# Patient Record
Sex: FEMALE | Race: BLACK OR AFRICAN AMERICAN | NOT HISPANIC OR LATINO | Employment: FULL TIME | URBAN - METROPOLITAN AREA
[De-identification: names, ages, dates, MRNs, and addresses within clinical notes are randomized per-mention and may not be internally consistent; named-entity substitution may affect disease eponyms.]

---

## 2017-08-21 ENCOUNTER — HOSPITAL ENCOUNTER (EMERGENCY)
Facility: HOSPITAL | Age: 37
Discharge: HOME | End: 2017-08-21
Attending: EMERGENCY MEDICINE
Payer: MEDICAID

## 2017-08-21 VITALS
HEIGHT: 61 IN | WEIGHT: 190 LBS | BODY MASS INDEX: 35.87 KG/M2 | RESPIRATION RATE: 20 BRPM | DIASTOLIC BLOOD PRESSURE: 81 MMHG | OXYGEN SATURATION: 95 % | HEART RATE: 89 BPM | TEMPERATURE: 98 F | SYSTOLIC BLOOD PRESSURE: 137 MMHG

## 2017-08-21 DIAGNOSIS — J06.9 UPPER RESPIRATORY TRACT INFECTION, UNSPECIFIED TYPE: Primary | ICD-10-CM

## 2017-08-21 DIAGNOSIS — R09.81 NASAL CONGESTION: ICD-10-CM

## 2017-08-21 PROCEDURE — 99283 EMERGENCY DEPT VISIT LOW MDM: CPT | Mod: 25

## 2017-08-21 PROCEDURE — 63600175 PHARM REV CODE 636 W HCPCS: Performed by: EMERGENCY MEDICINE

## 2017-08-21 PROCEDURE — 96372 THER/PROPH/DIAG INJ SC/IM: CPT

## 2017-08-21 RX ORDER — PROMETHAZINE HYDROCHLORIDE AND DEXTROMETHORPHAN HYDROBROMIDE 6.25; 15 MG/5ML; MG/5ML
5 SYRUP ORAL EVERY 6 HOURS PRN
Qty: 120 ML | Refills: 0 | Status: SHIPPED | OUTPATIENT
Start: 2017-08-21 | End: 2017-08-31

## 2017-08-21 RX ORDER — DEXAMETHASONE SODIUM PHOSPHATE 4 MG/ML
8 INJECTION, SOLUTION INTRA-ARTICULAR; INTRALESIONAL; INTRAMUSCULAR; INTRAVENOUS; SOFT TISSUE
Status: COMPLETED | OUTPATIENT
Start: 2017-08-21 | End: 2017-08-21

## 2017-08-21 RX ADMIN — DEXAMETHASONE SODIUM PHOSPHATE 8 MG: 4 INJECTION, SOLUTION INTRAMUSCULAR; INTRAVENOUS at 02:08

## 2017-08-21 NOTE — ED NOTES
Discharge instructions given to pt by provider; she was sent to lobby before dc vitals could be done

## 2017-08-21 NOTE — ED PROVIDER NOTES
SCRIBE #1 NOTE: I, Claire Hyatt, am scribing for, and in the presence of, Van Khan MD. I have scribed the entire note.      History      Chief Complaint   Patient presents with    Sinusitis     watery eyes, runny nose, facial pain, and cough since Sunday       Review of patient's allergies indicates:   Allergen Reactions    Aspirin Rash    Aspirin, buffered Rash        HPI   HPI    8/21/2017, 1:12 PM   History obtained from the patient      History of Present Illness: Fransisca Willis is a 37 y.o. female patient who presents to the Emergency Department for sinusitis which onset gradually 2 days ago. Symptoms are constant and moderate in severity.  No mitigating or exacerbating factors reported. Associated sxs include watery eyes, congestion, sinus pressure, cough, and rhinorrhea. Patient denies any fever, chills, SOB, CP, HA, dizziness, and all other sxs at this time. Pt states that she takes oral birth control daily. No prior Tx rpeorted. No further complaints or concerns at this time.         Arrival mode: Personal vehicle      PCP: Primary Doctor No       Past Medical History:  Past Medical History:   Diagnosis Date    Miscarriage     x1    Ovarian tumor        Past Surgical History:  Past Surgical History:   Procedure Laterality Date    LEFT OOPHORECTOMY           Family History:  Family History   Problem Relation Age of Onset    Hypertension         Social History:  Social History     Social History Main Topics    Smoking status: Never Smoker    Smokeless tobacco: Not on file    Alcohol use No    Drug use: No    Sexual activity: Not on file       ROS   Review of Systems   Constitutional: Negative for chills and fever.   HENT: Positive for congestion, rhinorrhea and sinus pressure. Negative for sore throat.    Eyes: Positive for discharge.   Respiratory: Positive for cough. Negative for shortness of breath.    Cardiovascular: Negative for chest pain.   Gastrointestinal: Negative for  "nausea.   Genitourinary: Negative for dysuria.   Musculoskeletal: Negative for back pain.   Skin: Negative for rash.   Neurological: Negative for dizziness, weakness and headaches.   Hematological: Does not bruise/bleed easily.       Physical Exam      Initial Vitals [08/21/17 1306]   BP Pulse Resp Temp SpO2   137/81 89 20 98.4 °F (36.9 °C) 95 %      MAP       99.67          Physical Exam  Nursing Notes and Vital Signs Reviewed.  Constitutional: Patient is in no apparent distress. Well-developed and well-nourished.  Head: Atraumatic. Normocephalic.  Eyes: PERRL. EOM intact. Conjunctivae are not pale. No scleral icterus.  ENT: Mucous membranes are moist. Congestion. Oropharynx is erythremic without extudates and symmetric.    Neck: Supple. Full ROM. No lymphadenopathy.  Cardiovascular: Regular rate. Regular rhythm. No murmurs, rubs, or gallops. Distal pulses are 2+ and symmetric.  Pulmonary/Chest: No respiratory distress. Clear to auscultation bilaterally. No wheezing, rales, or rhonchi.  Abdominal: Soft and non-distended.  There is no tenderness.  No rebound, guarding, or rigidity. Good bowel sounds.  Genitourinary: No CVA tenderness  Musculoskeletal: Moves all extremities. No obvious deformities. No edema. No calf tenderness.  Skin: Warm and dry.  Neurological:  Alert, awake, and appropriate.  Normal speech.  No acute focal neurological deficits are appreciated.  Psychiatric: Normal affect. Good eye contact. Appropriate in content.    ED Course    Procedures  ED Vital Signs:  Vitals:    08/21/17 1306   BP: 137/81   Pulse: 89   Resp: 20   Temp: 98.4 °F (36.9 °C)   TempSrc: Oral   SpO2: 95%   Weight: 86.2 kg (190 lb)   Height: 5' 1" (1.549 m)              The Emergency Provider reviewed the vital signs and test results, which are outlined above.    ED Discussion   1:16 PM:Discussed with pt all pertinent ED information and results. Discussed pt dx  and plan of tx. Gave pt all f/u and return to the ED instructions. All " questions and concerns were addressed at this time. Pt expresses understanding of information and instructions, and is comfortable with plan to discharge. Pt is stable for discharge.        ED Medication(s):  Medications - No data to display    New Prescriptions    PROMETHAZINE-DEXTROMETHORPHAN (PROMETHAZINE-DM) 6.25-15 MG/5 ML SYRP    Take 5 mLs by mouth every 6 (six) hours as needed.       Follow-up Information     [facility] in 2 days.    Contact information:  [address] [facility] [id number] [phone]                     Medical Decision Making              Scribe Attestation:   Scribe #1: I performed the above scribed service and the documentation accurately describes the services I performed. I attest to the accuracy of the note.    Attending:   Physician Attestation Statement for Scribe #1: I, Van Khan MD, personally performed the services described in this documentation, as scribed by Claire Hyatt, in my presence, and it is both accurate and complete.          Clinical Impression       ICD-10-CM ICD-9-CM   1. Upper respiratory tract infection, unspecified type J06.9 465.9   2. Nasal congestion R09.81 478.19       Disposition:   Disposition: Discharged  Condition: Stable         Van Khan MD  08/21/17 3546

## 2017-08-26 ENCOUNTER — HOSPITAL ENCOUNTER (EMERGENCY)
Facility: HOSPITAL | Age: 37
Discharge: HOME | End: 2017-08-26
Payer: MEDICAID

## 2017-08-26 VITALS
DIASTOLIC BLOOD PRESSURE: 88 MMHG | TEMPERATURE: 98 F | HEART RATE: 72 BPM | RESPIRATION RATE: 18 BRPM | WEIGHT: 190 LBS | HEIGHT: 61 IN | OXYGEN SATURATION: 100 % | SYSTOLIC BLOOD PRESSURE: 125 MMHG | BODY MASS INDEX: 35.87 KG/M2

## 2017-08-26 DIAGNOSIS — N93.8 DYSFUNCTIONAL UTERINE BLEEDING: Primary | ICD-10-CM

## 2017-08-26 LAB
B-HCG UR QL: NEGATIVE
BILIRUB UR QL STRIP: NEGATIVE
CLARITY UR: CLEAR
COLOR UR: YELLOW
GLUCOSE UR QL STRIP: NEGATIVE
HGB UR QL STRIP: ABNORMAL
KETONES UR QL STRIP: NEGATIVE
LEUKOCYTE ESTERASE UR QL STRIP: NEGATIVE
NITRITE UR QL STRIP: NEGATIVE
PH UR STRIP: 7 [PH] (ref 5–8)
PROT UR QL STRIP: NEGATIVE
SP GR UR STRIP: 1.01 (ref 1–1.03)
URN SPEC COLLECT METH UR: ABNORMAL
UROBILINOGEN UR STRIP-ACNC: NEGATIVE EU/DL

## 2017-08-26 PROCEDURE — 81003 URINALYSIS AUTO W/O SCOPE: CPT

## 2017-08-26 PROCEDURE — 81025 URINE PREGNANCY TEST: CPT

## 2017-08-26 PROCEDURE — 99283 EMERGENCY DEPT VISIT LOW MDM: CPT

## 2017-08-26 NOTE — ED PROVIDER NOTES
"SCRIBE #1 NOTE: I, Claire Hyatt, am scribing for, and in the presence of,GAMA Quiroz NP . I have scribed the entire note.      History      Chief Complaint   Patient presents with    Vaginal Bleeding     reports LMP on 8/13 and today found large amount blood in toilet after urinating.        Review of patient's allergies indicates:   Allergen Reactions    Aspirin Rash    Aspirin, buffered Rash        HPI   HPI    8/26/2017, 6:13 PM   History obtained from the patient      History of Present Illness: Fransisca Willis is a 37 y.o. female patient who presents to the Emergency Department for vaginal bleeding which onset gradually today while using the bathroom about 45 minutes PTA. Pt states that she noticed a large amount of bleeding in the toilet. Pt states that last menstrual cycle was 8/13/17. Symptoms are constant and moderate in severity.  No mitigating or exacerbating factors reported. Associated sxs include "heartburn". Patient denies any fever, chills, constipation, hematochezia, dysuria, hematuria, urinary frequency, difficulty urinating, urine decreased, N/V/D, back pain, vaginal discharge/odor, vaginal pain, and all other sxs at this time. No prior Tx reported. No further complaints or concerns at this time.         Arrival mode: Personal vehicle      PCP: Primary Doctor No       Past Medical History:  Past Medical History:   Diagnosis Date    Miscarriage     x1    Ovarian tumor        Past Surgical History:  Past Surgical History:   Procedure Laterality Date    LEFT OOPHORECTOMY           Family History:  Family History   Problem Relation Age of Onset    Hypertension         Social History:  Social History     Social History Main Topics    Smoking status: Never Smoker    Smokeless tobacco: Not on file    Alcohol use No    Drug use: No    Sexual activity: Not on file       ROS   Review of Systems   Constitutional: Negative for chills and fever.   HENT: Negative for sore throat.    Respiratory: " "Negative for shortness of breath.    Cardiovascular: Negative for chest pain.   Gastrointestinal: Negative for abdominal pain, blood in stool, constipation, diarrhea, nausea and vomiting.        + "heartburn"   Genitourinary: Positive for vaginal bleeding. Negative for decreased urine volume, difficulty urinating, dysuria, frequency, vaginal discharge and vaginal pain.   Musculoskeletal: Negative for back pain.   Skin: Negative for rash.   Neurological: Negative for weakness.   Hematological: Does not bruise/bleed easily.       Physical Exam      Initial Vitals [08/26/17 1807]   BP Pulse Resp Temp SpO2   125/88 72 18 98.2 °F (36.8 °C) 100 %      MAP       100.33          Physical Exam  Nursing Notes and Vital Signs Reviewed.  Constitutional: Patient is in no apparent distress. Well-developed and well-nourished.  Head: Atraumatic. Normocephalic.  Eyes: PERRL. EOM intact. Conjunctivae are not pale. No scleral icterus.  ENT: Mucous membranes are moist. Oropharynx is clear and symmetric.    Neck: Supple. Full ROM. No lymphadenopathy.  Cardiovascular: Regular rate. Regular rhythm. No murmurs, rubs, or gallops. Distal pulses are 2+ and symmetric.  Pulmonary/Chest: No respiratory distress. Clear to auscultation bilaterally. No wheezing, rales, or rhonchi.  Abdominal: Soft and non-distended.  There is no tenderness.  No rebound, guarding, or rigidity. Good bowel sounds.  Genitourinary: No CVA tenderness  Musculoskeletal: Moves all extremities. No obvious deformities. No edema. No calf tenderness.  Skin: Warm and dry.  Neurological:  Alert, awake, and appropriate.  Normal speech.  No acute focal neurological deficits are appreciated.  Psychiatric: Normal affect. Good eye contact. Appropriate in content.    ED Course    Procedures  ED Vital Signs:  Vitals:    08/26/17 1807   BP: 125/88   Pulse: 72   Resp: 18   Temp: 98.2 °F (36.8 °C)   TempSrc: Oral   SpO2: 100%   Weight: 86.2 kg (190 lb)   Height: 5' 1" (1.549 m) "       Abnormal Lab Results:  Labs Reviewed   URINALYSIS - Abnormal; Notable for the following:        Result Value    Occult Blood UA Trace (*)     All other components within normal limits   PREGNANCY TEST, URINE RAPID        All Lab Results:  Results for orders placed or performed during the hospital encounter of 08/26/17   Pregnancy, urine rapid (UPT)   Result Value Ref Range    Preg Test, Ur Negative    Urinalysis Catheterized   Result Value Ref Range    Specimen UA Urine, Catheterized     Color, UA Yellow Yellow, Straw, Liv    Appearance, UA Clear Clear    pH, UA 7.0 5.0 - 8.0    Specific Gravity, UA 1.015 1.005 - 1.030    Protein, UA Negative Negative    Glucose, UA Negative Negative    Ketones, UA Negative Negative    Bilirubin (UA) Negative Negative    Occult Blood UA Trace (A) Negative    Nitrite, UA Negative Negative    Urobilinogen, UA Negative <2.0 EU/dL    Leukocytes, UA Negative Negative                The Emergency Provider reviewed the vital signs and test results, which are outlined above.    ED Discussion     7:29 PM:  Discussed with pt all pertinent ED information and results. Discussed pt dx and plan of tx. Gave pt all f/u and return to the ED instructions. All questions and concerns were addressed at this time. Pt expresses understanding of information and instructions, and is comfortable with plan to discharge. Pt is stable for discharge.          ED Medication(s):  Medications - No data to display    New Prescriptions    No medications on file             Medical Decision Making    Medical Decision Making:   Clinical Tests:   Lab Tests: Reviewed and Ordered           Scribe Attestation:   Scribe #1: I performed the above scribed service and the documentation accurately describes the services I performed. I attest to the accuracy of the note.    Attending:   Physician Attestation Statement for Scribe #1: GAMA LIMON NP, personally performed the services described in this documentation, as  scribed by Claire Hyatt, in my presence, and it is both accurate and complete.          Clinical Impression     Dysfunctional uterine Bleeding    Disposition:   Disposition: Discharged  Condition: Stable         Gwyn Quiroz NP  08/26/17 2741

## 2024-01-19 PROBLEM — Z11.3 SCREENING EXAMINATION FOR STD (SEXUALLY TRANSMITTED DISEASE): Status: ACTIVE | Noted: 2024-01-19

## 2024-01-19 PROBLEM — N76.0 ACUTE VAGINITIS: Status: ACTIVE | Noted: 2024-01-19

## 2024-01-26 PROBLEM — R92.8 ABNORMAL FINDING ON BREAST IMAGING: Status: ACTIVE | Noted: 2024-01-26

## 2024-02-01 ENCOUNTER — TELEPHONE (OUTPATIENT)
Dept: SURGERY | Facility: CLINIC | Age: 44
End: 2024-02-01
Payer: MEDICAID

## 2024-02-01 DIAGNOSIS — R92.8 ABNORMAL FINDING ON BREAST IMAGING: Primary | ICD-10-CM

## 2024-02-05 ENCOUNTER — HOSPITAL ENCOUNTER (OUTPATIENT)
Dept: RADIOLOGY | Facility: HOSPITAL | Age: 44
Discharge: HOME | End: 2024-02-05
Attending: SURGERY
Payer: MEDICAID

## 2024-02-05 DIAGNOSIS — R92.8 ABNORMAL FINDING ON BREAST IMAGING: ICD-10-CM

## 2024-02-05 PROCEDURE — 19083 BX BREAST 1ST LESION US IMAG: CPT | Mod: LT,,, | Performed by: RADIOLOGY

## 2024-02-05 PROCEDURE — 88305 TISSUE EXAM BY PATHOLOGIST: CPT | Performed by: PATHOLOGY

## 2024-02-05 PROCEDURE — 88305 TISSUE EXAM BY PATHOLOGIST: CPT | Mod: 26,,, | Performed by: PATHOLOGY

## 2024-02-05 PROCEDURE — 77065 DX MAMMO INCL CAD UNI: CPT | Mod: 26,LT,, | Performed by: RADIOLOGY

## 2024-02-05 PROCEDURE — 27200940 US BREAST BIOPSY WITH IMAGING 1ST SITE LEFT

## 2024-02-05 PROCEDURE — A4648 IMPLANTABLE TISSUE MARKER: HCPCS

## 2024-02-05 PROCEDURE — 77065 DX MAMMO INCL CAD UNI: CPT | Mod: TC,LT

## 2024-02-07 ENCOUNTER — TELEPHONE (OUTPATIENT)
Dept: SURGERY | Facility: CLINIC | Age: 44
End: 2024-02-07
Payer: MEDICAID

## 2024-02-07 LAB
FINAL PATHOLOGIC DIAGNOSIS: NORMAL
GROSS: NORMAL
Lab: NORMAL

## 2024-02-07 NOTE — TELEPHONE ENCOUNTER
Called patient to review biopsy results, no answer and voicemail box full. Will continue to contact.     ----- Message from Rafal Abraham MD sent at 2/7/2024 10:33 AM CST -----  Benign and concordant.      Thank you.

## 2024-02-08 ENCOUNTER — TELEPHONE (OUTPATIENT)
Dept: SURGERY | Facility: CLINIC | Age: 44
End: 2024-02-08
Payer: MEDICAID

## 2024-02-08 NOTE — TELEPHONE ENCOUNTER
Called patient to discuss breast biopsy results, pt scheduled with Camille Ramirez to discuss results more and future follow up imaging.     ----- Message from Ericka Torres MD sent at 2/7/2024 11:29 AM CST -----  Since she was never seen. You can make a follow up with Camille to review results.

## 2024-02-08 NOTE — PROGRESS NOTES
Breast Surgical Oncology  [place]          PCP:  Liseth, Primary Doctor  Date of Service: 2024    CHIEF COMPLAINT:   Abnormal mammogram    DIAGNOSIS:   Fransisca Willis is a 43 y.o. female who is kindly referred by Dr. Jeaneth Farrell for evaluation of an abnormal mammogram.     2024- Pt had a routine duncan screening mammo at [facility] that revealed a 6 mm nodule in the left breast at the 7 oclock position- diagnostic imaging recommended    2024- left diagnostic and ultrasound revealed a solid nodule- ultrasound biopsy was recommended    2024- left breast ultrasound biopsy was performed  Pathology revealed:  Left breast, 7 o'clock, core biopsy:   Fibroadenoma.   Background breast tissue shows fibrocystic changes including usual ductal hyperplasia and apocrine metaplasia.   No evidence of atypia or malignancy.     Comment:   This case was also reviewed by Dr. ANTHONY Marquez, who concurs with the above diagnosis.   Benign and concordant per Dr. Rafal Abraham    Pt relates slight soreness left breast biopsy site.      Her breast cancer risk factor profile is as follows: Menarche at 12, Menopause at n/a.  She is . Age at first live birth was 20.     LMP- 2019 due to ablation  HRT- none  Breast feeding- none  Dense breasts- cat B  ETOH- occasionally  Genetic mutation- none  radiation to neck or chest wall- none  previous breast biopsy or breast surgery- atypical ductal hyperplasia or lobular hyperplasia- left breast ultrasound biopsy    FH:father colon cancer - 61, no breast cancer history in family        FAMILY HISTORY:     Family History   Problem Relation Age of Onset    Hypertension Unknown         PAST MEDICAL HISTORY:     Past Medical History:   Diagnosis Date    Depression     Miscarriage     x1    Ovarian tumor        SURGICAL HISTORY:     Past Surgical History:   Procedure Laterality Date    DILATION AND CURETTAGE OF UTERUS      LEFT OOPHORECTOMY      excision of dermoid cyst        SOCIAL HISTORY:     Social History     Tobacco Use    Smoking status: Never   Substance Use Topics    Alcohol use: No    Drug use: No        MEDICATIONS/ALLERGIES:     Current Outpatient Medications   Medication Instructions    metroNIDAZOLE (FLAGYL) 500 mg, Oral, Every 12 hours     Review of patient's allergies indicates:   Allergen Reactions    Aspirin Rash    Aspirin, buffered Rash       REVIEW OF SYSTEMS:   I have reviewed 12 systems, including 2 points per system. Pertinent positives reported are: left breast soreness at biopsy site- intermittent duncan breast soreness    PHYSICAL EXAM:   General: The patient appears well and is in no acute distress.     BREAST EXAM  No Asymmetry- large pendulous breasts  Right:  - Mass: No  - Skin change: No  - Nipple Discharge: No  - Nipple retraction: No  - Axillary LAD: No  Left:   - Mass: No  - Skin change: slight bruising base of breast- slight induration at puncture site- No redness or drainage  - Nipple Discharge: No  - Nipple retraction: No  - Axillary LAD: No    IMAGING:       Results for orders placed in visit on 01/19/24    Mammo Digital Screening Bilat w/ [place]    Narrative  Result:  Mammo Digital Screening Bilat w/ [place]    History:  Patient is 43 y.o. and is seen for a screening mammogram.    Films Compared:  Prior images (if available) were compared.    Findings:  This procedure was performed using tomosynthesis. Computer-aided detection was utilized in the interpretation of this examination.    Small 6 mm circumscribed rounded nodule inferior medial quadrant left breast approximately 6 cm from the nipple close to the 7 o'clock position.    Scattered benign calcifications.  No additional suspicious findings.    Impression  Small nodule left breast at 7:00 .    BI-RADS Category:  Overall: 0 - Incomplete: Needs Additional Imaging Evaluation      Recommendation:  Breast ultrasound is recommended.      PATHOLOGY:   2/5/2024  Left breast, 7 o'clock, core biopsy:    Fibroadenoma.   Background breast tissue shows fibrocystic changes including usual ductal hyperplasia and apocrine metaplasia.   No evidence of atypia or malignancy.     Comment:   This case was also reviewed by Dr. ANTHONY Marquez, who concurs with the above diagnosis.     Benign and concordant per Dr. Rafal Abraham    ASSESSMENT:     1. Abnormal finding on breast imaging    2. Follow-up examination of abnormal mammogram    3. Fibroadenoma of breast, left    4. Counseling and coordination of care    5. Counseling on health promotion and disease prevention    6. Encounter for screening breast examination          PLAN:   Fransisca Willis is a 43 y.o. female who presents for evaluation of left breast abnormal mammo that resulted in an ultrasound guided biopsy. Results reflect a benign fibroadenoma    Explained would recommend left diagnostic mammo and ultrasound in August at [facility] for 6 mon f/u after biopsy and exam then duncan screening mammo 2/2025 and f/u with gyn    Removal of the fibroadenoma is not recommended at this time due to it's very small size. Parameters to remove would be if over 3 cm's in size, increasing in size rapidly or develops suspicious characteristics    Discussed how to perform BSE and what to look for on exam    TC score 8.7%- not considered high risk for breast cancer    Duncan breast soreness intermittently- discussed potential causes- pt relates increase in her caffeine intake - pt will try to decrease to see if will help improve    Pt relates desires breast reduction - she will check with her insurance to see which plastic surgeon she can see for the consult      Fransisca Willis has a normal breast exam today. We discussed the possible signs and symptoms of breast cancer as lump, masses, new asymmetries, skin changes and nipple changes. She is encouraged to contact me if any new breast concerns arise.  She has been provided a handout that details today's discussion and her plan.

## 2024-02-14 ENCOUNTER — OFFICE VISIT (OUTPATIENT)
Dept: SURGERY | Facility: CLINIC | Age: 44
End: 2024-02-14
Payer: MEDICAID

## 2024-02-14 VITALS — BODY MASS INDEX: 39.87 KG/M2 | WEIGHT: 211.19 LBS | HEIGHT: 61 IN

## 2024-02-14 DIAGNOSIS — Z71.89 COUNSELING ON HEALTH PROMOTION AND DISEASE PREVENTION: ICD-10-CM

## 2024-02-14 DIAGNOSIS — Z12.39 ENCOUNTER FOR SCREENING BREAST EXAMINATION: ICD-10-CM

## 2024-02-14 DIAGNOSIS — R92.8 ABNORMAL MAMMOGRAM: ICD-10-CM

## 2024-02-14 DIAGNOSIS — Z71.89 COUNSELING AND COORDINATION OF CARE: ICD-10-CM

## 2024-02-14 DIAGNOSIS — R92.8 ABNORMAL FINDING ON BREAST IMAGING: Primary | ICD-10-CM

## 2024-02-14 DIAGNOSIS — D24.2 FIBROADENOMA OF BREAST, LEFT: ICD-10-CM

## 2024-02-14 DIAGNOSIS — R92.8 FOLLOW-UP EXAMINATION OF ABNORMAL MAMMOGRAM: ICD-10-CM

## 2024-02-14 PROCEDURE — 3008F BODY MASS INDEX DOCD: CPT | Mod: CPTII,,, | Performed by: NURSE PRACTITIONER

## 2024-02-14 PROCEDURE — 1159F MED LIST DOCD IN RCRD: CPT | Mod: CPTII,,, | Performed by: NURSE PRACTITIONER

## 2024-02-14 PROCEDURE — 99999 PR PBB SHADOW E&M-EST. PATIENT-LVL III: CPT | Mod: PBBFAC,,, | Performed by: NURSE PRACTITIONER

## 2024-02-14 PROCEDURE — 99203 OFFICE O/P NEW LOW 30 MIN: CPT | Mod: S$PBB,,, | Performed by: NURSE PRACTITIONER

## 2024-02-14 PROCEDURE — 1160F RVW MEDS BY RX/DR IN RCRD: CPT | Mod: CPTII,,, | Performed by: NURSE PRACTITIONER

## 2024-02-14 PROCEDURE — 99213 OFFICE O/P EST LOW 20 MIN: CPT | Mod: PBBFAC | Performed by: NURSE PRACTITIONER
